# Patient Record
Sex: FEMALE | Race: WHITE | NOT HISPANIC OR LATINO | Employment: FULL TIME | URBAN - METROPOLITAN AREA
[De-identification: names, ages, dates, MRNs, and addresses within clinical notes are randomized per-mention and may not be internally consistent; named-entity substitution may affect disease eponyms.]

---

## 2017-09-27 ENCOUNTER — HISTORICAL (OUTPATIENT)
Dept: LAB | Facility: HOSPITAL | Age: 42
End: 2017-09-27

## 2017-09-27 LAB — PTH-INTACT SERPL-MCNC: 40.7 PG/DL (ref 14–72)

## 2018-12-19 LAB
BILIRUB SERPL-MCNC: NEGATIVE MG/DL
BLOOD URINE, POC: NEGATIVE
CLARITY, POC UA: NORMAL
COLOR, POC UA: NORMAL
GLUCOSE UR QL STRIP: NEGATIVE
KETONES UR QL STRIP: NORMAL
LEUKOCYTE EST, POC UA: NEGATIVE
NITRITE, POC UA: NEGATIVE
PH, POC UA: 5
PROTEIN, POC: NORMAL
SPECIFIC GRAVITY, POC UA: 1.01
UROBILINOGEN, POC UA: NORMAL

## 2019-02-07 LAB
INFLUENZA A ANTIGEN, POC: NEGATIVE
INFLUENZA B ANTIGEN, POC: NEGATIVE
RAPID GROUP A STREP (OHS): NEGATIVE

## 2019-04-24 ENCOUNTER — HISTORICAL (OUTPATIENT)
Dept: ADMINISTRATIVE | Facility: HOSPITAL | Age: 44
End: 2019-04-24

## 2019-09-11 ENCOUNTER — HISTORICAL (OUTPATIENT)
Dept: ADMINISTRATIVE | Facility: HOSPITAL | Age: 44
End: 2019-09-11

## 2020-08-13 LAB
CHOLEST SERPL-MSCNC: 206 MG/DL (ref 0–200)
HBA1C MFR BLD: 5.1 % (ref 4–6)
HDLC SERPL-MCNC: 61 MG/DL (ref 35–70)
LDLC SERPL CALC-MCNC: 133 MG/DL (ref 0–160)
TRIGL SERPL-MCNC: 59 MG/DL (ref 40–160)

## 2020-09-02 ENCOUNTER — HISTORICAL (OUTPATIENT)
Dept: ADMINISTRATIVE | Facility: HOSPITAL | Age: 45
End: 2020-09-02

## 2020-12-14 ENCOUNTER — HISTORICAL (OUTPATIENT)
Dept: LAB | Facility: HOSPITAL | Age: 45
End: 2020-12-14

## 2020-12-14 LAB — SARS-COV-2 RNA RESP QL NAA+PROBE: DETECTED

## 2021-01-31 ENCOUNTER — HISTORICAL (OUTPATIENT)
Dept: ADMINISTRATIVE | Facility: HOSPITAL | Age: 46
End: 2021-01-31

## 2021-01-31 LAB
INFLUENZA A ANTIGEN, POC: NEGATIVE
INFLUENZA B ANTIGEN, POC: NEGATIVE

## 2021-02-17 ENCOUNTER — HISTORICAL (OUTPATIENT)
Dept: URGENT CARE | Facility: CLINIC | Age: 46
End: 2021-02-17

## 2021-02-17 LAB
BILIRUB SERPL-MCNC: NEGATIVE MG/DL
BLOOD URINE, POC: NEGATIVE
CLARITY, POC UA: NORMAL
COLOR, POC UA: YELLOW
GLUCOSE UR QL STRIP: NEGATIVE
KETONES UR QL STRIP: NEGATIVE
LEUKOCYTE EST, POC UA: NORMAL
NITRITE, POC UA: POSITIVE
PH, POC UA: 5
PROTEIN, POC: NORMAL
SPECIFIC GRAVITY, POC UA: 1.01
UROBILINOGEN, POC UA: NORMAL

## 2021-03-02 ENCOUNTER — HISTORICAL (OUTPATIENT)
Dept: ADMINISTRATIVE | Facility: HOSPITAL | Age: 46
End: 2021-03-02

## 2021-03-02 LAB
APPEARANCE, UA: ABNORMAL
BACTERIA #/AREA URNS AUTO: ABNORMAL /HPF
BILIRUB UR QL STRIP: NEGATIVE MG/DL
COLOR UR: YELLOW
GLUCOSE (UA): NEGATIVE MG/DL
HGB UR QL STRIP: ABNORMAL UNIT/L
KETONES UR QL STRIP: NEGATIVE MG/DL
LEUKOCYTE ESTERASE UR QL STRIP: ABNORMAL UNIT/L
NITRITE UR QL STRIP.AUTO: POSITIVE
PH UR STRIP: 5.5 [PH]
PROT UR QL STRIP: ABNORMAL MG/DL
RBC #/AREA URNS HPF: ABNORMAL /HPF
SP GR UR STRIP: 1.01
SQUAMOUS EPITHELIAL, UA: ABNORMAL /LPF
UROBILINOGEN UR STRIP-ACNC: 0.2 MG/DL
WBC #/AREA URNS AUTO: ABNORMAL /[HPF]

## 2021-04-08 ENCOUNTER — HISTORICAL (OUTPATIENT)
Dept: ADMINISTRATIVE | Facility: HOSPITAL | Age: 46
End: 2021-04-08

## 2022-04-07 ENCOUNTER — HISTORICAL (OUTPATIENT)
Dept: ADMINISTRATIVE | Facility: HOSPITAL | Age: 47
End: 2022-04-07

## 2022-04-24 VITALS
OXYGEN SATURATION: 97 % | DIASTOLIC BLOOD PRESSURE: 80 MMHG | WEIGHT: 148.13 LBS | HEIGHT: 66 IN | SYSTOLIC BLOOD PRESSURE: 120 MMHG | BODY MASS INDEX: 23.81 KG/M2

## 2022-05-05 NOTE — HISTORICAL [facility] CERNER
This is a historical note converted from [place]. Formatting and pictures may have been removed.  Please reference [place] for original formatting and attached multimedia. Chief Complaint  BACK PAIN XS 3 WKS  History of Present Illness  Pt was warming up at [employer] 3 weeks ago. She felt a pulling in her lower back at that time and it has not felt right since. She denies any history of low back problems. Heat alleviates the discomfort. Worse with bending and and transferring. ?She has to walk slowly. She has tried rolling and stretching. She has tried Tylenol, Advil, cyclobenzaprine and robaxin; none of which worked. She had two Norco left which helped her sleep. Denies leg pain or weakness.  Review of Systems  See HPI.  Physical Exam  Vitals & Measurements  T:?36.7? ?C (Oral)? HR:?103(Peripheral)? BP:?106/80? SpO2:?98%?  HT:?167.64?cm? WT:?68.100?kg? BMI:?24.23?  General:?She is a well-developed well-nourished white female in no apparent distress.? She transfers slowly and deliberately.? She ambulates slowly.? She appears to be in mild discomfort?with movement.  Low back: Normal in appearance,?no skin lesions,?she has tenderness to?palpation over mid lumbar spine?as well as?over the adjacent paralumbar muscles and bilateral sacroiliac joints. ?She has?limitation of extension and bilateral lateral flexion.? She also has limitation of flexion?due to?discomfort.? Negative straight leg raise bilaterally.  Assessment/Plan  Low back pain?M54.5  X-rays pending.  Light activity/stretches/continue heat.  Norco 7.5 mg: Take 1 tablet every 6 hours as needed; note:?Take?at bedtime for sleep.? Potential side effects including sedation,?drowsiness?dizziness and habituation discussed with patient.? Patient expressed understanding.  Continue Celebrex.  Patient has clonazepam?for?anxiety; I advised her to take 1/2?to 1 tablet in evening for muscle spasm.  She is to call if symptoms persist or worsen.  Ordered:  Clinic Follow-up PRN,  04/08/21 13:51:00 CDT, [facility] - [facility], Future Order  Office/Outpatient Visit Level 3 Established [id number] PC, Low back pain, [facility] - [facility], 04/08/21 13:51:00 CDT  XR Spine Lumbar 2 or 3 Views, Routine, 04/08/21 13:33:00 CDT, None, Ambulatory, Rad Type, Low back pain, Not Scheduled, [facility], 04/08/21 13:33:00 CDT  ?  Orders:  acetaminophen-HYDROcodone, 1 tab(s), Oral, q6hr, PRN PRN as needed for pain, # 15 tab(s), 0 Refill(s), Pharmacy: [facility], 167.64, cm, Height/Length Dosing, 04/08/21 13:18:00 CDT, 68.1, kg, Weight Dosing, 04/08/21 13:18:00 CDT  lisinopril, See Instructions, TAKE1/2 TABLET Q PM, # 30 tab(s), 5 Refill(s), Pharmacy: [facility], 168, cm, Height/Length Dosing, 09/02/20 15:57:00 CDT, 68, kg, Weight Dosing, 09/02/20 15:57:00 CDT  Referrals  Clinic Follow-up PRN, 04/08/21 13:51:00 CDT, [facility] - [facility], Future Order   Problem List/Past Medical History  Ongoing  ADHD (attention deficit hyperactivity disorder), inattentive type  De Quervains tenosynovitis  Depression  Hypertension  Lateral epicondylitis, left elbow  Trochanteric bursitis, right hip  Historical  Cough  Renal calculus  Renal calculus  Procedure/Surgical History  History of total left knee replacement (08/13/2018)  FESS (functional endoscopic sinus surgery) total (08/06/2002)  Nasal septoplasty (08/06/2002)  Tonsillectomy (08/06/2002)  Cystoscopy (09.1997)  Appendectomy   Medications  Adderall 20 mg oral tablet, 20 mg= 1 tab(s), Oral, TID  Adderall 20 mg oral tablet, 20 mg= 1 tab(s), Oral, TID  Adderall 20 mg oral tablet, 20 mg= 1 tab(s), Oral, TID  CeleBREX 100 mg oral capsule, 100 mg= 1 cap(s), Oral, BID, PRN, 2 refills  FLUoxetine 40 mg oral capsule, See Instructions, 5 refills  KlonoPIN 1 mg oral tablet, See Instructions  lisinopril 10 mg oral tablet, See Instructions, 5 refills  Norco 7.5 mg-325 mg oral tablet, 1 tab(s), Oral, q6hr, PRN  ProAir HFA 90 mcg/inh inhalation aerosol with adapter, 2 puff(s),  INH, q6hr, PRN  Allergies  Blistex Lip Balm?(Lip swelling)  Social History  Abuse/Neglect  No, 02/17/2021  Alcohol  Current, 1-2 times per month, 07/26/2018  Employment/School  Employed, Work/School description: AGENCY NURSE., 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 Living situation: Home/Independent., 07/26/2018  Nutrition/Health  Regular, Caffeine intake amount: COFFEE ON OCCASION (3 X/WEEK)., 07/26/2018  Substance Use  Never, 12/19/2018  Tobacco  Never (less than 100 in lifetime), N/A, 02/17/2021  Family History  Breast cancer.: Mother.  Hypertension.: Father.  Immunizations  Vaccine Date Status   influenza virus vaccine, inactivated 01/20/2017 Recorded   influenza virus vaccine, inactivated 10/24/2014 Recorded   Health Maintenance  Health Maintenance  ???Pending?(in the next year)  ??? ??OverDue  ??? ? ? ?Hypertension Management-BMP due??05/20/15??and every 1??year(s)  ??? ? ? ?Diabetes Screening due??05/19/17??and every 3??year(s)  ??? ? ? ?Influenza Vaccine due??10/01/20??and every 1??day(s)  ??? ? ? ?Alcohol Misuse Screening due??01/02/21??and every 1??year(s)  ??? ??Due?  ??? ? ? ?ADL Screening due??04/08/21??and every 1??year(s)  ??? ? ? ?Depression Screening due??04/08/21??Unknown Frequency  ??? ? ? ?Hypertension Management-Education due??04/08/21??and every 1??year(s)  ??? ? ? ?Tetanus Vaccine due??04/08/21??and every 10??year(s)  ??? ??Due In Future?  ??? ? ? ?TB Skin Test not due until??08/31/21??and every 1??year(s)  ??? ? ? ?Obesity Screening not due until??01/01/22??and every 1??year(s)  ???Satisfied?(in the past 1 year)  ??? ??Satisfied?  ??? ? ? ?Blood Pressure Screening on??04/08/21.??Satisfied by Jas Casillas LPN  ??? ? ? ?Body Mass Index Check on??04/08/21.??Satisfied by Jas Casillas LPN  ??? ? ? ?Cervical Cancer Screening on??08/13/20.??Satisfied by Deborah Camargo  ??? ? ? ?Hypertension Management-Blood Pressure  on??04/08/21.??Satisfied by Jas Casillas LPN  ??? ? ? ?Obesity Screening on??04/08/21.??Satisfied by Jas Casillas LPN  ??? ? ? ?TB Skin Test on??08/31/20.  ?      X-rays of lumbar spine are normal.

## 2022-05-05 NOTE — HISTORICAL [facility]ERN[facility]
This is a historical note converted from [place]. Formatting and pictures may have been removed.  Please reference [place] for original formatting and attached multimedia. Chief Complaint  Achy and cough since yesterday  History of Present Illness  Pt?45 Years?White?Female?seen and evaluated in a limited status for concern for COVID-19. In order to decrease the risk of exposure to the hospital and health care workers, only a limited exam was done.  ?   Risk Factors Include  Had COVID about 1.5 months prior presents for about 15 hours of body aches and cough.? No fever.? B lower lung tightness and tenderness just above the diaphragm when taking in deep breaths.  ?   Provider Precautions  Surgical Mask,  Standard Isolation Gown,  Gloves,  Eye Protection- Face Shield  ?  Covid Screening?  No confirmed close contact  No travel to high risk area  No Recent Testing Done  Review of Systems  Constitutional_negative for fever  ENMT_+rhinorrhea  Respiratory_+ cough  Gastrointestinal_negative for nausea or vomiting  Integumentary_negative for rash  Physical Exam  Vitals & Measurements  T:?36.4? ?C (Oral)? HR:?118(Peripheral)? RR:?17? BP:?104/74? SpO2:?100%?  HT:?168.00?cm? WT:?74.000?kg? BMI:?26.22?  Gen: WD, fatigued, lying flat on exam table  CV: S1S2 RRR no MRG  Resp: reduced breath sounds BL  HEENT: clear rhinorrhea, no cervical JOSÉ ANTONIO, +PND, TMs without bulging or erythema bilaterally, no posterior pharyngeal erythema?  Psych: Cooperative, approp mood and affect  Assessment/Plan  1.?Viral illness?B34.9  ?Force fluids.? Medrol with food.? Tylenol.? Recheck as needed.?  No work until improved symptoms.? Start with 3 days excuse.?  Differential includes COVID variant.  Ordered:  Office/Outpatient Visit Level 3 Established [id number] PC, Viral illness  Cough, [facility], 01/31/21 9:55:00 CST  ?  2.?Cough?R05,?Cough?R05  ?X ray of the chest done today, per my review some patchy changes to the lower lobes.?  ?Will call with final  report.  Ordered:  Office/Outpatient Visit Level 3 Established [id number] PC, Viral illness  Cough, [facility], 01/31/21 9:55:00 CST  XR Chest 2 Views, Routine, 01/31/21 9:33:00 CST, None, Ambulatory, Rad Type, Cough, Not Scheduled, 01/31/21 9:33:00 CST  ?   Problem List/Past Medical History  Ongoing  ADHD (attention deficit hyperactivity disorder), inattentive type  De Quervains tenosynovitis  Depression  Hypertension  Lateral epicondylitis, left elbow  Trochanteric bursitis, right hip  Historical  Cough  Renal calculus  Renal calculus  Procedure/Surgical History  History of total left knee replacement (08/13/2018)  FESS (functional endoscopic sinus surgery) total (08/06/2002)  Nasal septoplasty (08/06/2002)  Tonsillectomy (08/06/2002)  Cystoscopy (09.1997)  Appendectomy   Medications  Adderall 20 mg oral tablet, 20 mg= 1 tab(s), Oral, TID  Adderall 20 mg oral tablet, 20 mg= 1 tab(s), Oral, TID  Adderall 20 mg oral tablet, 20 mg= 1 tab(s), Oral, TID  CeleBREX 100 mg oral capsule, 100 mg= 1 cap(s), Oral, BID, PRN, 2 refills  FLUoxetine 40 mg oral capsule, See Instructions, 5 refills  KlonoPIN 1 mg oral tablet, See Instructions, 1 refills  lisinopril 10 mg oral tablet, See Instructions, 5 refills  vitamin E, See Instructions  Allergies  Blistex Lip Balm?(Lip swelling)  Social History  Abuse/Neglect  No, 01/31/2021  Alcohol  Current, 1-2 times per month, 07/26/2018  Employment/School  Employed, Work/School description: AGENCY NURSE., 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 Living situation: Home/Independent., 07/26/2018  Nutrition/Health  Regular, Caffeine intake amount: COFFEE ON OCCASION (3 X/WEEK)., 07/26/2018  Substance Use  Never, 12/19/2018  Tobacco  Never (less than 100 in lifetime), N/A, 01/31/2021  Family History  Breast cancer.: Mother.  Hypertension.: Father.  Immunizations  Vaccine Date Status   influenza virus vaccine, inactivated 01/20/2017  Recorded   influenza virus vaccine, inactivated 10/24/2014 Recorded   Health Maintenance  Health Maintenance  ???Pending?(in the next year)  ??? ??OverDue  ??? ? ? ?Hypertension Management-BMP due??05/20/15??and every 1??year(s)  ??? ? ? ?Diabetes Screening due??05/19/17??and every 3??year(s)  ??? ? ? ?Influenza Vaccine due??10/01/20??and every 1??day(s)  ??? ??Due?  ??? ? ? ?Alcohol Misuse Screening due??01/02/21??and every 1??year(s)  ??? ? ? ?ADL Screening due??01/31/21??and every 1??year(s)  ??? ? ? ?Depression Screening due??01/31/21??Unknown Frequency  ??? ? ? ?Hypertension Management-Education due??01/31/21??and every 1??year(s)  ??? ? ? ?Tetanus Vaccine due??01/31/21??and every 10??year(s)  ??? ??Due In Future?  ??? ? ? ?TB Skin Test not due until??08/31/21??and every 1??year(s)  ??? ? ? ?Obesity Screening not due until??01/01/22??and every 1??year(s)  ???Satisfied?(in the past 1 year)  ??? ??Satisfied?  ??? ? ? ?Blood Pressure Screening on??01/31/21.??Satisfied by Pura GUERRERO, Marquis A.  ??? ? ? ?Body Mass Index Check on??01/31/21.??Satisfied by Pura RT, Marquis A.  ??? ? ? ?Cervical Cancer Screening on??08/13/20.??Satisfied by Deborah Camargo  ??? ? ? ?Hypertension Management-Blood Pressure on??01/31/21.??Satisfied by Puar RT, Marquis A.  ??? ? ? ?Influenza Vaccine on??03/02/20.??Satisfied by Nova Obrien LPN  ??? ? ? ?Obesity Screening on??01/31/21.??Satisfied by Marquis Yen  ??? ? ? ?TB Skin Test on??08/31/20.  ?

## 2022-05-05 NOTE — HISTORICAL [facility]
This is a historical note converted from [place]. Formatting and pictures may have been removed.  Please reference [place] for original formatting and attached multimedia. Chief Complaint  Pain to left wrist onset last Friday while working out  History of Present Illness  43-year-old presents with a history of?left wrist pain since?6 days.? States?felt and heard a pop?while?lifting?deadweight at the gym.? No fall no trauma.? Worse pain at nighttime, feeling?tingling?to 4th and 5th fingers. ?No weakness. ?Not dropping things.? Left range of motion limited due to pain.  Review of Systems  Constitutional : No fever, no fatigue  Neck : Negative except HPI  Respiratory : No shortness of breath, no wheezing  Cardiovascular : No chest pain  Musculoskeletal : Negative except HPI  Integumentary : No rash, no abnormal lesion  Neurological : Negative for tingling numbness and weakness  Physical Exam  Vitals & Measurements  T:?36.9? ?C (Oral)? HR:?111(Peripheral)? RR:?16? BP:?139/93? SpO2:?100%?  HT:?168?cm? WT:?76?kg? BMI:?26.93?  General : Alert and oriented, No apparent distress, Afebrile  Neck -: supple, Non Tender  Respiratory :Lungs are clear to auscultate, Breath sounds are equal  Cardiovascular : Normal rate, normal volume pulse bilateral  Musculoskeletal : Left wrist mild fullness noted?radially. ?No point tenderness.? Range of motion present limited with full flexion due to pain. ?Radial pulse 2+ bilateral.  Integumentary :?Warm, Dry?and no rash  Neurologic : Alert and Oriented X 4, sensations intact, motor intact  Assessment/Plan  1.?Left wrist sprain?S63.502A  ? Reviewed the x-rays,?no concerns of fracture.? Discussed in detail on sprain, condition and course. ?Radiology final results will be monitored and reported  Rest, ice, compression and elevation.? Aleve for pain every 12 hours as needed. ?With any acute change call or return to clinic  Ordered:  Office/Outpatient Visit Level 3 Established [id number] PC, Left wrist  sprain, [facility], 04/24/19 17:06:00 CDT  XR Wrist Left Minimum 3 Views, Routine, 04/24/19 16:46:00 CDT, Pain, None, Ambulatory, Rad Type, Left wrist sprain, Not Scheduled, 04/24/19 16:46:00 CDT  ?   Problem List/Past Medical History  Ongoing  Attention deficit disorder  Cough  Depression  Hypertension  Trochanteric bursitis, right hip  Historical  Renal calculus  Renal calculus  Procedure/Surgical History  History of total left knee replacement (08/13/2018)  FESS (functional endoscopic sinus surgery) total (08/06/2002)  Nasal septoplasty (08/06/2002)  Tonsillectomy (08/06/2002)  Cystoscopy (09.1997)  Appendectomy   Medications  Adderall 20 mg oral tablet, 20 mg= 1 tab(s), Oral, BID  Adderall 20 mg oral tablet, 20 mg= 1 tab(s), Oral, BID  Adderall 20 mg oral tablet, 20 mg= 1 tab(s), Oral, BID  ESCITALOPRAM 10 MG TABLET, 10 mg= 1 tab(s), Oral, Daily  lisinopril 10 mg oral tablet, See Instructions, 5 refills  Allergies  Blistex Lip Balm?(Lip swelling)  Social History  Alcohol  Current, 1-2 times per month, 07/26/2018  Employment/School  Employed, Work/School description: AGENCY NURSE., 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 Living situation: Home/Independent., 07/26/2018  Nutrition/Health  Regular, Caffeine intake amount: COFFEE ON OCCASION (3 X/WEEK)., 07/26/2018  Substance Abuse  Never, 12/19/2018  Tobacco  Never (less than 100 in lifetime), N/A, 04/24/2019  Family History  Breast cancer.: Mother.  Hypertension.: Father.  Immunizations  Vaccine Date Status   influenza virus vaccine, inactivated 01/20/2017 Recorded   influenza virus vaccine, inactivated 10/24/2014 Recorded   Health Maintenance  Health Maintenance  ???Pending?(in the next year)  ??? ??OverDue  ??? ? ? ?Hypertension Management-BMP due??11/15/18??and every 1??year(s)  ??? ??Due?  ??? ? ? ?ADL Screening due??04/24/19??and every 1??year(s)  ??? ? ? ?Alcohol Misuse Screening  due??04/24/19??and every 1??year(s)  ??? ? ? ?Diabetes Screening due??04/24/19??and every?  ??? ? ? ?Hypertension Management-Education due??04/24/19??and every 1??year(s)  ??? ? ? ?Smoking Cessation due??04/24/19??Variable frequency  ??? ? ? ?TB Skin Test due??04/24/19??Variable frequency  ??? ? ? ?Tetanus Vaccine due??04/24/19??and every 10??year(s)  ??? ??Due In Future?  ??? ? ? ?Blood Pressure Screening not due until??04/23/20??and every 1??year(s)  ??? ? ? ?Body Mass Index Check not due until??04/23/20??and every 1??year(s)  ??? ? ? ?Hypertension Management-Blood Pressure not due until??04/23/20??and every 1??year(s)  ???Satisfied?(in the past 1 year)  ??? ??Satisfied?  ??? ? ? ?Blood Pressure Screening on??04/24/19.??Satisfied by Gil Bledsoe LPN  ??? ? ? ?Body Mass Index Check on??04/24/19.??Satisfied by Gil Bledsoe LPN  ??? ? ? ?Cervical Cancer Screening on??12/03/18.??Satisfied by Shirley Cruz  ??? ? ? ?Hypertension Management-Blood Pressure on??04/24/19.??Satisfied by Gil Bledsoe LPN  ??? ? ? ?Obesity Screening on??04/24/19.??Satisfied by Gil Bledsoe LPN  ?  ?

## 2022-05-05 NOTE — HISTORICAL [facility]
This is a historical note converted from Lisa. Formatting and pictures may have been removed.  Please reference Lisa for original formatting and attached multimedia. Chief Complaint  LEFT SIDE RIB PAIN- WHILE ON VACATION, SHE SLIPPED AND FELL WHILE CARRYING A BOX OF FROZEN CHICKEN. ?THE BOX WENT UP IN THE AIR AND FELL ONTO HER LEFT FLANK AREA  History of Present Illness  Patient presents with left rib cage discomfort since Saturday. She?cant take a deep breath. She has pain?if she coughs. ?She slipped while carrying a box of frozen chicken?and the box hit her in the side.??She has had?pain since then. No bruising. She has been taking Ibuprofen, Thermocare and she had 2 Tramadol left. She denies any shortness of breath.  Review of Systems  See HPI.  Physical Exam  Vitals & Measurements  HT:?168?cm? WT:?74.8?kg? BMI:?26.5?  Neuro: She is well-developed well-nourished white female?appears to be in mild distress.? She is sitting hunched over. ?She walks gingerly. ?She transfers slowly.  Chest wall: Normal in appearance, no swelling, no?bruising,?she has significant tenderness?over her left?anterior?lateral rib cage?just below?her bra line.? She guards with exam.  Assessment/Plan  1.?Contusion of rib on left side?S20.212A  ?Patient appears to have multiple left rib contusions.  Norco 7.5 m every 6 hours as needed for severe pain.? She also may take Aleve or ibuprofen.  Rest/light activity as tolerated.  She is to call if symptoms persist or worsen.  X-rays pending.  Ordered:  Office/Outpatient Visit Level 3 Established [id number] PC, Contusion of rib on left side, [facility], 19 14:13:00 CDT  ?  Orders:  acetaminophen-HYDROcodone, 1 tab(s), Oral, q6hr, PRN PRN as needed for pain, # 24 tab(s), 0 Refill(s), [facility]  [facility] Follow-up PRN, 19 14:13:00 CDT, [facility] - [facility], Future Order  X-Ray Ribs Left W PA Chest [id number] PC, 19 13:50:00 CDTERIN AMB - [facility], 19  13:50:00 CDT  XR Ribs Left W PA Chest, Routine, 09/11/19 13:53:00 CDT, injury, None, Ambulatory, Rad Type, Not Scheduled, 09/11/19 13:53:00 CDT  Referrals  Clinic Follow-up PRN, 09/11/19 14:13:00 CDT, [facility] - [facility], Future Order   Problem List/Past Medical History  Ongoing  ADHD (attention deficit hyperactivity disorder), inattentive type  Contusion of rib on left side  Cough  De Quervains tenosynovitis  Depression  Hypertension  Trochanteric bursitis, right hip  Historical  Renal calculus  Renal calculus  Procedure/Surgical History  History of total left knee replacement (08/13/2018)  FESS (functional endoscopic sinus surgery) total (08/06/2002)  Nasal septoplasty (08/06/2002)  Tonsillectomy (08/06/2002)  Cystoscopy (09.1997)  Appendectomy   Medications  Adderall 20 mg oral tablet, 20 mg= 1 tab(s), Oral, BID  Adderall 20 mg oral tablet, 20 mg= 1 tab(s), Oral, BID  Adderall 20 mg oral tablet, 20 mg= 1 tab(s), Oral, BID  FLUoxetine 40 mg oral capsule, See Instructions, 5 refills  lisinopril 10 mg oral tablet, See Instructions, 5 refills  Norco 7.5 mg-325 mg oral tablet, 1 tab(s), Oral, q6hr, PRN  ondansetron 8 mg oral tablet, disintegrating, See Instructions  traMADol 50 mg oral tablet, 50 mg= 1 tab(s), Oral, q8hr  Allergies  Blistex Lip Balm?(Lip swelling)  Social History  Abuse/Neglect  No, 09/11/2019  No, 09/04/2019  No, 06/10/2019  Alcohol  Current, 1-2 times per month, 07/26/2018  Employment/School  Employed, Work/School description: AGENCY NURSE., 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 Living situation: Home/Independent., 07/26/2018  Nutrition/Health  Regular, Caffeine intake amount: COFFEE ON OCCASION (3 X/WEEK)., 07/26/2018  Substance Use  Never, 12/19/2018  Tobacco  Never (less than 100 in lifetime), N/A, 09/11/2019  Never (less than 100 in lifetime), N/A, 09/11/2019  Never (less than 100 in lifetime), N/A, 09/04/2019  Never (less than  100 in lifetime), N/A, 06/10/2019  Never (less than 100 in lifetime), N/A, 04/24/2019  Family History  Breast cancer.: Mother.  Hypertension.: Father.  Immunizations  Vaccine Date Status   influenza virus vaccine, inactivated 01/20/2017 Recorded   influenza virus vaccine, inactivated 10/24/2014 Recorded   Health Maintenance  Health Maintenance  ???Pending?(in the next year)  ??? ??OverDue  ??? ? ? ?Hypertension Management-BMP due??11/15/18??and every 1??year(s)  ??? ? ? ?Alcohol Misuse Screening due??01/01/19??and every 1??year(s)  ??? ??Due?  ??? ? ? ?ADL Screening due??09/11/19??and every 1??year(s)  ??? ? ? ?Diabetes Screening due??09/11/19??and every?  ??? ? ? ?Hypertension Management-Education due??09/11/19??and every 1??year(s)  ??? ? ? ?Influenza Vaccine due??09/11/19??and every?  ??? ? ? ?Tetanus Vaccine due??09/11/19??and every 10??year(s)  ??? ??Due In Future?  ??? ? ? ?Obesity Screening not due until??01/01/20??and every 1??year(s)  ??? ? ? ?TB Skin Test not due until??08/17/20??and every 1??year(s)  ??? ? ? ?Blood Pressure Screening not due until??09/03/20??and every 1??year(s)  ??? ? ? ?Hypertension Management-Blood Pressure not due until??09/03/20??and every 1??year(s)  ??? ? ? ?Body Mass Index Check not due until??09/10/20??and every 1??year(s)  ???Satisfied?(in the past 1 year)  ??? ??Satisfied?  ??? ? ? ?Blood Pressure Screening on??09/04/19.??Satisfied by Nova Obrien LPN  ??? ? ? ?Body Mass Index Check on??09/11/19.??Satisfied by Nova Obrien LPN  ??? ? ? ?Cervical Cancer Screening on??12/03/18.??Satisfied by Shirley Cruz  ??? ? ? ?Hypertension Management-Blood Pressure on??09/04/19.??Satisfied by Nova Obrien LPN  ??? ? ? ?Obesity Screening on??09/11/19.??Satisfied by Nova Obrien LPN  ??? ? ? ?TB Skin Test on??08/17/19.  ?      X-rays are negative for rib fractures.
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
## 2022-09-13 PROBLEM — M25.552 BILATERAL HIP PAIN: Status: ACTIVE | Noted: 2022-09-13

## 2022-09-13 PROBLEM — M25.551 BILATERAL HIP PAIN: Status: ACTIVE | Noted: 2022-09-13

## 2022-09-21 ENCOUNTER — HISTORICAL (OUTPATIENT)
Dept: ADMINISTRATIVE | Facility: HOSPITAL | Age: 47
End: 2022-09-21

## 2022-09-22 ENCOUNTER — HISTORICAL (OUTPATIENT)
Dept: ADMINISTRATIVE | Facility: HOSPITAL | Age: 47
End: 2022-09-22

## 2022-10-22 ENCOUNTER — OFFICE VISIT (OUTPATIENT)
Dept: URGENT CARE | Facility: CLINIC | Age: 47
End: 2022-10-22
Payer: COMMERCIAL

## 2022-10-22 VITALS
TEMPERATURE: 100 F | HEART RATE: 91 BPM | HEIGHT: 65 IN | RESPIRATION RATE: 20 BRPM | SYSTOLIC BLOOD PRESSURE: 132 MMHG | BODY MASS INDEX: 25.83 KG/M2 | DIASTOLIC BLOOD PRESSURE: 92 MMHG | WEIGHT: 155 LBS | OXYGEN SATURATION: 99 %

## 2022-10-22 DIAGNOSIS — R05.9 COUGH, UNSPECIFIED TYPE: ICD-10-CM

## 2022-10-22 DIAGNOSIS — J32.9 SINUSITIS, UNSPECIFIED CHRONICITY, UNSPECIFIED LOCATION: Primary | ICD-10-CM

## 2022-10-22 LAB
CTP QC/QA: YES
CTP QC/QA: YES
FLUAV AG NPH QL: NEGATIVE
FLUBV AG NPH QL: NEGATIVE
SARS-COV-2 RDRP RESP QL NAA+PROBE: NEGATIVE

## 2022-10-22 PROCEDURE — 1159F MED LIST DOCD IN RCRD: CPT | Mod: CPTII,,, | Performed by: FAMILY MEDICINE

## 2022-10-22 PROCEDURE — 3075F PR MOST RECENT SYSTOLIC BLOOD PRESS GE 130-139MM HG: ICD-10-PCS | Mod: CPTII,,, | Performed by: FAMILY MEDICINE

## 2022-10-22 PROCEDURE — 96372 PR INJECTION,THERAP/PROPH/DIAG2ST, IM OR SUBCUT: ICD-10-PCS | Mod: ,,, | Performed by: FAMILY MEDICINE

## 2022-10-22 PROCEDURE — 1159F PR MEDICATION LIST DOCUMENTED IN MEDICAL RECORD: ICD-10-PCS | Mod: CPTII,,, | Performed by: FAMILY MEDICINE

## 2022-10-22 PROCEDURE — 99213 PR OFFICE/OUTPT VISIT, EST, LEVL III, 20-29 MIN: ICD-10-PCS | Mod: 25,,, | Performed by: FAMILY MEDICINE

## 2022-10-22 PROCEDURE — 96372 THER/PROPH/DIAG INJ SC/IM: CPT | Mod: ,,, | Performed by: FAMILY MEDICINE

## 2022-10-22 PROCEDURE — 3080F PR MOST RECENT DIASTOLIC BLOOD PRESSURE >= 90 MM HG: ICD-10-PCS | Mod: CPTII,,, | Performed by: FAMILY MEDICINE

## 2022-10-22 PROCEDURE — 3075F SYST BP GE 130 - 139MM HG: CPT | Mod: CPTII,,, | Performed by: FAMILY MEDICINE

## 2022-10-22 PROCEDURE — 4010F ACE/ARB THERAPY RXD/TAKEN: CPT | Mod: CPTII,,, | Performed by: FAMILY MEDICINE

## 2022-10-22 PROCEDURE — 87635 SARS-COV-2 COVID-19 AMP PRB: CPT | Mod: QW,,, | Performed by: FAMILY MEDICINE

## 2022-10-22 PROCEDURE — 87804 POCT INFLUENZA A/B: ICD-10-PCS | Mod: 59,QW,, | Performed by: FAMILY MEDICINE

## 2022-10-22 PROCEDURE — 99213 OFFICE O/P EST LOW 20 MIN: CPT | Mod: 25,,, | Performed by: FAMILY MEDICINE

## 2022-10-22 PROCEDURE — 87635: ICD-10-PCS | Mod: QW,,, | Performed by: FAMILY MEDICINE

## 2022-10-22 PROCEDURE — 4010F PR ACE/ARB THEARPY RXD/TAKEN: ICD-10-PCS | Mod: CPTII,,, | Performed by: FAMILY MEDICINE

## 2022-10-22 PROCEDURE — 3080F DIAST BP >= 90 MM HG: CPT | Mod: CPTII,,, | Performed by: FAMILY MEDICINE

## 2022-10-22 PROCEDURE — 87804 INFLUENZA ASSAY W/OPTIC: CPT | Mod: QW,,, | Performed by: FAMILY MEDICINE

## 2022-10-22 RX ORDER — BETAMETHASONE SODIUM PHOSPHATE AND BETAMETHASONE ACETATE 3; 3 MG/ML; MG/ML
6 INJECTION, SUSPENSION INTRA-ARTICULAR; INTRALESIONAL; INTRAMUSCULAR; SOFT TISSUE
Status: DISCONTINUED | OUTPATIENT
Start: 2022-10-22 | End: 2022-10-22

## 2022-10-22 RX ORDER — HYDROCODONE POLISTIREX AND CHLORPHENIRAMINE POLISTIREX 10; 8 MG/5ML; MG/5ML
5 SUSPENSION, EXTENDED RELEASE ORAL EVERY 12 HOURS PRN
Qty: 70 ML | Refills: 0 | Status: SHIPPED | OUTPATIENT
Start: 2022-10-22 | End: 2022-10-29

## 2022-10-22 RX ORDER — CEFDINIR 300 MG/1
300 CAPSULE ORAL 2 TIMES DAILY
Qty: 20 CAPSULE | Refills: 0 | Status: SHIPPED | OUTPATIENT
Start: 2022-10-22 | End: 2022-11-01

## 2022-10-22 RX ORDER — BETAMETHASONE SODIUM PHOSPHATE AND BETAMETHASONE ACETATE 3; 3 MG/ML; MG/ML
12 INJECTION, SUSPENSION INTRA-ARTICULAR; INTRALESIONAL; INTRAMUSCULAR; SOFT TISSUE
Status: COMPLETED | OUTPATIENT
Start: 2022-10-22 | End: 2022-10-22

## 2022-10-22 RX ADMIN — BETAMETHASONE SODIUM PHOSPHATE AND BETAMETHASONE ACETATE 12 MG: 3; 3 INJECTION, SUSPENSION INTRA-ARTICULAR; INTRALESIONAL; INTRAMUSCULAR; SOFT TISSUE at 10:10

## 2022-10-22 NOTE — PROGRESS NOTES
"Subjective:       Patient ID: Natasha Darnell is a 47 y.o. female.    Vitals:  height is 5' 5" (1.651 m) and weight is 70.3 kg (155 lb). Her oral temperature is 99.5 °F (37.5 °C). Her blood pressure is 132/92 (abnormal) and her pulse is 91. Her respiration is 20 and oxygen saturation is 99%.     Chief Complaint: Cough (Cough, congestion, x 2 days)  We 7-year-old  female presents to office with complaints as listed above and below.  Cough, congestion, x 2 days      Cough  Associated symptoms include a fever and a sore throat.   Constitution: Positive for fatigue and fever.   HENT:  Positive for sinus pain, sinus pressure and sore throat.    Respiratory:  Positive for cough.      Objective:      Physical Exam   Constitutional: She is oriented to person, place, and time.  Non-toxic appearance. She appears ill. No distress.   Eyes: Pupils are equal, round, and reactive to light.   Cardiovascular: Normal rate, regular rhythm, normal heart sounds and normal pulses.   Pulmonary/Chest: Effort normal. No respiratory distress. She has wheezes. She has rhonchi.   Neurological: no focal deficit. She is alert and oriented to person, place, and time.   Skin: Skin is warm and not diaphoretic.   Psychiatric: Her behavior is normal. Mood normal.   Nursing note and vitals reviewed.      Assessment:       1. Sinusitis, unspecified chronicity, unspecified location    2. Cough, unspecified type          Plan:         Sinusitis, unspecified chronicity, unspecified location  -     cefdinir (OMNICEF) 300 MG capsule; Take 1 capsule (300 mg total) by mouth 2 (two) times daily. for 10 days  Dispense: 20 capsule; Refill: 0  -     Discontinue: betamethasone acetate-betamethasone sodium phosphate injection 6 mg  Treatment recommendations as above.    Return to clinic symptoms fail to improve or worsen.  Cough, unspecified type  -     POCT COVID-19 Rapid Screening  -     POCT Influenza A/B    Other orders  -     betamethasone " acetate-betamethasone sodium phosphate injection 12 mg

## 2022-11-29 PROBLEM — U07.1 COVID-19: Status: ACTIVE | Noted: 2022-11-29

## 2022-12-07 ENCOUNTER — PATIENT MESSAGE (OUTPATIENT)
Dept: ADMINISTRATIVE | Facility: OTHER | Age: 47
End: 2022-12-07
Payer: COMMERCIAL

## 2023-01-01 PROBLEM — F41.1 GENERALIZED ANXIETY DISORDER: Status: ACTIVE | Noted: 2023-01-01

## 2023-01-01 PROBLEM — F90.0 ATTENTION DEFICIT HYPERACTIVITY DISORDER (ADHD), PREDOMINANTLY INATTENTIVE TYPE: Status: ACTIVE | Noted: 2023-01-01

## 2023-01-04 PROBLEM — Z23 IMMUNIZATION DUE: Status: ACTIVE | Noted: 2023-01-04

## 2023-03-20 PROBLEM — R52 BODY ACHES: Status: ACTIVE | Noted: 2023-03-20

## 2023-03-20 PROBLEM — J02.9 SORE THROAT: Status: ACTIVE | Noted: 2023-03-20

## 2023-03-20 PROBLEM — J06.9 VIRAL UPPER RESPIRATORY TRACT INFECTION: Status: ACTIVE | Noted: 2023-03-20

## 2023-04-06 LAB
HPV GENOTYPE 16: POSITIVE
HPV GENOTYPE 18,45: NEGATIVE
HUMAN PAPILLOMAVIRUS (HPV): POSITIVE

## 2023-07-24 PROBLEM — G44.209 ACUTE NON INTRACTABLE TENSION-TYPE HEADACHE: Status: ACTIVE | Noted: 2023-07-24

## 2023-10-04 LAB
PAP RECOMMENDATION EXT: NORMAL
PAP SMEAR: NORMAL

## 2023-10-24 PROBLEM — G47.00 INSOMNIA: Status: ACTIVE | Noted: 2023-10-24

## 2023-12-08 PROBLEM — J40 BRONCHITIS: Status: ACTIVE | Noted: 2023-12-08

## 2024-02-06 PROBLEM — F51.01 PRIMARY INSOMNIA: Status: ACTIVE | Noted: 2023-10-24

## 2024-02-07 ENCOUNTER — OFFICE VISIT (OUTPATIENT)
Dept: PRIMARY CARE CLINIC | Facility: CLINIC | Age: 49
End: 2024-02-07
Payer: COMMERCIAL

## 2024-02-07 VITALS
OXYGEN SATURATION: 97 % | SYSTOLIC BLOOD PRESSURE: 132 MMHG | RESPIRATION RATE: 18 BRPM | HEART RATE: 91 BPM | DIASTOLIC BLOOD PRESSURE: 84 MMHG | HEIGHT: 65 IN | BODY MASS INDEX: 26.39 KG/M2 | WEIGHT: 158.38 LBS

## 2024-02-07 DIAGNOSIS — F33.42 RECURRENT MAJOR DEPRESSIVE DISORDER, IN FULL REMISSION: ICD-10-CM

## 2024-02-07 DIAGNOSIS — F90.0 ATTENTION DEFICIT HYPERACTIVITY DISORDER (ADHD), PREDOMINANTLY INATTENTIVE TYPE: Primary | ICD-10-CM

## 2024-02-07 DIAGNOSIS — F51.01 PRIMARY INSOMNIA: ICD-10-CM

## 2024-02-07 DIAGNOSIS — F41.1 GENERALIZED ANXIETY DISORDER: ICD-10-CM

## 2024-02-07 PROCEDURE — 99213 OFFICE O/P EST LOW 20 MIN: CPT | Mod: ,,, | Performed by: FAMILY MEDICINE

## 2024-02-07 PROCEDURE — 3079F DIAST BP 80-89 MM HG: CPT | Mod: CPTII,,, | Performed by: FAMILY MEDICINE

## 2024-02-07 PROCEDURE — 3008F BODY MASS INDEX DOCD: CPT | Mod: CPTII,,, | Performed by: FAMILY MEDICINE

## 2024-02-07 PROCEDURE — 3075F SYST BP GE 130 - 139MM HG: CPT | Mod: CPTII,,, | Performed by: FAMILY MEDICINE

## 2024-02-07 PROCEDURE — 1160F RVW MEDS BY RX/DR IN RCRD: CPT | Mod: CPTII,,, | Performed by: FAMILY MEDICINE

## 2024-02-07 PROCEDURE — 1159F MED LIST DOCD IN RCRD: CPT | Mod: CPTII,,, | Performed by: FAMILY MEDICINE

## 2024-02-07 RX ORDER — FLUOXETINE HYDROCHLORIDE 40 MG/1
CAPSULE ORAL
Qty: 30 CAPSULE | Refills: 5 | Status: SHIPPED | OUTPATIENT
Start: 2024-02-07

## 2024-02-07 RX ORDER — DEXTROAMPHETAMINE SACCHARATE, AMPHETAMINE ASPARTATE, DEXTROAMPHETAMINE SULFATE AND AMPHETAMINE SULFATE 5; 5; 5; 5 MG/1; MG/1; MG/1; MG/1
TABLET ORAL
Qty: 60 TABLET | Refills: 0 | Status: SHIPPED | OUTPATIENT
Start: 2024-02-07

## 2024-02-07 RX ORDER — BUPROPION HYDROCHLORIDE 300 MG/1
TABLET ORAL
Qty: 30 TABLET | Refills: 5 | Status: SHIPPED | OUTPATIENT
Start: 2024-02-07

## 2024-02-07 RX ORDER — DEXTROAMPHETAMINE SACCHARATE, AMPHETAMINE ASPARTATE, DEXTROAMPHETAMINE SULFATE AND AMPHETAMINE SULFATE 5; 5; 5; 5 MG/1; MG/1; MG/1; MG/1
1 TABLET ORAL 2 TIMES DAILY
Qty: 60 TABLET | Refills: 0 | Status: SHIPPED | OUTPATIENT
Start: 2024-02-07 | End: 2024-03-08

## 2024-02-07 NOTE — PROGRESS NOTES
.Follow-up (3 month f/u/Trazodone gave nightmares )         HPI:    Patient presents for recheck/refill medications.  Patient states medications are working well; she is able to concentrate, focus and stay on task.  She denies palpitations, unintentional weight loss, headaches or dry mouth.     reviewed.    Narcotics agreement updated 02/07/2024.    Current Outpatient Medications:     clonazePAM (KLONOPIN) 1 MG tablet, TAKE 1/2 TO ONE TABLET ONCE DAILY. AS NEEDED FOR ANXIETY, Disp: 30 tablet, Rfl: 5    dextroamphetamine-amphetamine (ADDERALL) 20 mg tablet, Take 1 tablet by mouth 2 times a day., Disp: 60 tablet, Rfl: 0    medroxyPROGESTERone (DEPO-PROVERA) 150 mg/mL injection, INJECT 1 ML INTRAMUSCULARLY EVERY THREE MONTHS AS NEEDED, Disp: , Rfl:     azithromycin (Z-MARIA DEL CARMEN) 250 MG tablet, Take 250 mg by mouth once daily., Disp: , Rfl:     buPROPion (WELLBUTRIN XL) 300 MG 24 hr tablet, TAKE ONE TABLET ONCE DAILY, Disp: 30 tablet, Rfl: 5    dextroamphetamine-amphetamine (ADDERALL) 20 mg tablet, Take 1 tablet by mouth twice a day., Disp: 60 tablet, Rfl: 0    dextroamphetamine-amphetamine (ADDERALL) 20 mg tablet, Take 1 tablet by mouth 2 (two) times a day. Fill: 3/06/2024., Disp: 60 tablet, Rfl: 0    dextroamphetamine-amphetamine (ADDERALL) 20 mg tablet, Take 1 tablet by mouth 2 times a day. Fill: 4/05/2024., Disp: 60 tablet, Rfl: 0    diclofenac (VOLTAREN) 75 MG EC tablet, 75 mg 2 (two) times daily., Disp: , Rfl:     FLUoxetine 40 MG capsule, Take 1 tablet by mouth daily., Disp: 30 capsule, Rfl: 5    MUCINEX 1,200 mg Ta12, TAKE ONE TABLET TWICE DAILY FOR 7 DAYS, Disp: , Rfl:       ROS:    She tried Trazadone but it caused bad nightmares. She is not sleeping. Lunesta: taste. Ambien: nightmares.      PE:    ..  Vitals:    02/07/24 1404   BP: 132/84   Pulse: 91   Resp: 18        General:  She is well-developed well-nourished white female in no apparent distress.  She is alert and oriented.    Chest: Clear to auscultation  bilaterally.    CV:  Regular rate rhythm without murmurs rubs gallops.        1. Attention deficit hyperactivity disorder (ADHD), predominantly inattentive type  Overview:  Patient is doing well on medications; refills x3 given.    07/24/2023: Patient is doing well on medications; refills x3 given.    10/24/2023: Patient received a notice from pharmacy that they can only fill 60 per month.  Refills x3 given.    02/07/2024:  Patient is doing well on medications; refills x3 given.      2. Generalized anxiety disorder  Overview:  Her anxiety has been stable; she takes medication as needed.   She takes Klonopin infrequently.     Refills sent.    07/24/2023: Patient has been taking 1/2 tablet of clonazepam most days.  Refills sent.    10/24/2023:  Patient has not been able to fill her clonazepam for 3 weeks secondary to shortage.  Her anxiety has been stable.    02/07/2024:  Patient states her anxiety has been doing well; has been decreased.    She takes clonazepam approximately twice a week      3. Recurrent major depressive disorder, in full remission  Overview:  Stable; continue Wellbutrin and Lexapro.  She changed her dosing to evening and is now sleeping better.    10/24/2023:  Her depression has been stable; she denies any sadness, depression or crying spells.    02/07/2024:  Patient states depression has been doing well; she denies any sadness, depression, crying spells or suicidal thoughts.  ER precautions given.    Continue Wellbutrin and fluoxetine; refills sent.    Orders:  -     buPROPion (WELLBUTRIN XL) 300 MG 24 hr tablet; TAKE ONE TABLET ONCE DAILY  Dispense: 30 tablet; Refill: 5  -     FLUoxetine 40 MG capsule; Take 1 tablet by mouth daily.  Dispense: 30 capsule; Refill: 5    4. Primary insomnia  Overview:  Patient has not been sleeping well.    Trial of trazodone 50 mg: Take 1 tab in the evening.  Patient advised to call with update.    02/07/2024:  Patient intolerant of trazodone secondary to  nightmares.    Lunesta causes a metallic taste.    She had nightmares with Ambien.  She declines any sleep medication at this time.      Other orders  -     dextroamphetamine-amphetamine (ADDERALL) 20 mg tablet; Take 1 tablet by mouth twice a day.  Dispense: 60 tablet; Refill: 0  -     dextroamphetamine-amphetamine (ADDERALL) 20 mg tablet; Take 1 tablet by mouth 2 (two) times a day. Fill: 3/06/2024.  Dispense: 60 tablet; Refill: 0  -     dextroamphetamine-amphetamine (ADDERALL) 20 mg tablet; Take 1 tablet by mouth 2 times a day. Fill: 4/05/2024.  Dispense: 60 tablet; Refill: 0              ..Follow up in about 3 months (around 5/7/2024) for Wellness exam fasting.

## 2024-02-11 DIAGNOSIS — F41.1 GENERALIZED ANXIETY DISORDER: ICD-10-CM

## 2024-02-12 RX ORDER — CLONAZEPAM 1 MG/1
TABLET ORAL
Qty: 30 TABLET | Refills: 2 | Status: SHIPPED | OUTPATIENT
Start: 2024-02-12

## 2024-03-05 ENCOUNTER — PATIENT MESSAGE (OUTPATIENT)
Dept: PRIMARY CARE CLINIC | Facility: CLINIC | Age: 49
End: 2024-03-05
Payer: COMMERCIAL

## 2024-03-05 ENCOUNTER — PATIENT OUTREACH (OUTPATIENT)
Dept: ADMINISTRATIVE | Facility: HOSPITAL | Age: 49
End: 2024-03-05
Payer: COMMERCIAL

## 2024-03-05 NOTE — LETTER
AUTHORIZATION FOR RELEASE OF   CONFIDENTIAL INFORMATION    Dear [facility],    We are seeing Natasha Darnell, date of birth 1975, in the clinic at [facility]. Alessandro Oconnor MD is the patient's PCP. Natasha Darnell has an outstanding lab/procedure at the time we reviewed her chart. In order to help keep her health information updated, she has authorized us to request the following medical record(s):        (  )  MAMMOGRAM                                      (  )  COLONOSCOPY      ( * )  PAP SMEAR                                          (  )  OUTSIDE LAB RESULTS     (  )  DEXA SCAN                                          (  )  EYE EXAM            (  )  FOOT EXAM                                          (  )  ENTIRE RECORD     (  )  OUTSIDE IMMUNIZATIONS                 (  )  _______________         Please fax records to [facility], Manuel, Chad B., MD, [phone].     If you have any questions, please contact Alexandru Harris, [facility] at [phone]..           Patient Name: Natasha Darnell  : 1975  Patient Phone #: [phone]

## 2024-03-05 NOTE — PROGRESS NOTES
Population Health. Out Reach. Reviewing patient's chart for quality metrics.Records request sent to [facility] office for pap. I attempt pt outreach re: colorectal cancer screening and diabetic screening lab, mammogram, no answer.         The following record(s)  below were uploaded for Health Maintenance .     8/13/2020 HEMOGLOBIN A1c  8/13/2020 LIPID PANEL

## 2024-03-08 NOTE — PROGRESS NOTES
The following record(s)  below were uploaded for Health Maintenance .          PAP SMEAR  2023  HPV SCREENING   2023

## 2024-03-26 ENCOUNTER — PATIENT MESSAGE (OUTPATIENT)
Dept: PRIMARY CARE CLINIC | Facility: CLINIC | Age: 49
End: 2024-03-26
Payer: COMMERCIAL

## 2024-04-08 ENCOUNTER — TELEPHONE (OUTPATIENT)
Dept: PRIMARY CARE CLINIC | Facility: CLINIC | Age: 49
End: 2024-04-08
Payer: COMMERCIAL

## 2024-05-04 PROBLEM — Z00.00 ENCOUNTER FOR WELLNESS EXAMINATION IN ADULT: Status: ACTIVE | Noted: 2024-05-04

## 2024-05-06 NOTE — PROGRESS NOTES
..Annual Exam       HISTORY OF PRESENT ILLNESS    This 48 y.o. female patient presents to the clinic today for a wellness CPX.  She has been feeling well.    She has been sleeping  Her appetite has been   She is ; no children.    She is a nurse.  She does not smoke.    She is alcohol  Gyn:   Colon?        The patient's Health Maintenance was reviewed and the following appears to be due at this time:   Health Maintenance Due   Topic Date Due    Hepatitis C Screening  Never done    HIV Screening  Never done    TETANUS VACCINE  Never done    Colorectal Cancer Screening  Never done    Mammogram  11/11/2021    Hemoglobin A1c (Diabetic Prevention Screening)  08/13/2023    COVID-19 Vaccine (3 - 2023-24 season) 09/01/2023       ..  Past Medical History:   Diagnosis Date    De Quervain's tenosynovitis     HTN (hypertension)     Lateral epicondylitis of left elbow     Trochanteric bursitis of right hip           ..  Past Surgical History:   Procedure Laterality Date    APPENDECTOMY      cryo to cervix  04/24/2023    CYSTOSCOPY  09/1997    FESS (functional endoscopic sinus surgery) total (  08/06/2002    History of total left knee replacement Left 08/13/2018    JOINT REPLACEMENT  8/13/18    Right knee    NASAL SEPTOPLASTY  08/06/2002    TONSILLECTOMY  08/06/2002          Current Outpatient Medications   Medication Instructions    azithromycin (Z-MARIA DEL CARMEN) 250 mg, Daily    buPROPion (WELLBUTRIN XL) 300 MG 24 hr tablet TAKE ONE TABLET ONCE DAILY    clonazePAM (KLONOPIN) 1 MG tablet Take 1/2-1 tablet daily as needed for anxiety.    dextroamphetamine-amphetamine (ADDERALL) 20 mg tablet Take 1 tablet by mouth 2 times a day.    dextroamphetamine-amphetamine (ADDERALL) 20 mg tablet Take 1 tablet by mouth twice a day.    dextroamphetamine-amphetamine (ADDERALL) 20 mg tablet Take 1 tablet by mouth 2 times a day. Fill: 4/05/2024.    diclofenac (VOLTAREN) 75 mg, 2 times daily    FLUoxetine 40 MG capsule Take 1 tablet by mouth daily.     medroxyPROGESTERone (DEPO-PROVERA) 150 mg/mL injection INJECT 1 ML INTRAMUSCULARLY EVERY THREE MONTHS AS NEEDED    MUCINEX 1,200 mg Ta12 TAKE ONE TABLET TWICE DAILY FOR 7 DAYS         ..  Social History     Socioeconomic History    Marital status:     Number of children: 0   Occupational History    Occupation: Nurse   Tobacco Use    Smoking status: Never    Smokeless tobacco: Never   Substance and Sexual Activity    Alcohol use: Yes     Comment: once every 2 weeks    Drug use: Never    Sexual activity: Yes     Partners: Male     Birth control/protection: None, Injection     Social Determinants of Health     Financial Resource Strain: Low Risk  (5/6/2024)    Overall Financial Resource Strain (CARDIA)     Difficulty of Paying Living Expenses: Not hard at all   Food Insecurity: No Food Insecurity (5/6/2024)    Hunger Vital Sign     Worried About Running Out of Food in the Last Year: Never true     Ran Out of Food in the Last Year: Never true   Transportation Needs: No Transportation Needs (5/6/2024)    PRAPARE - Transportation     Lack of Transportation (Medical): No     Lack of Transportation (Non-Medical): No   Physical Activity: Insufficiently Active (5/6/2024)    Exercise Vital Sign     Days of Exercise per Week: 4 days     Minutes of Exercise per Session: 30 min   Stress: Stress Concern Present (5/6/2024)    [place] [employer] - Occupational Stress Questionnaire     Feeling of Stress : Very much   Housing Stability: Unknown (5/6/2024)    Housing Stability Vital Sign     Unable to Pay for Housing in the Last Year: No          ..  Family History   Problem Relation Name Age of Onset    Breast cancer Mother      Hypertension Father Farhad     Hypertension Sister Nova     Cushing syndrome Sister            ..  Review of patient's allergies indicates:   Allergen Reactions    Dimethicone-znox-vit a-d-aloe Swelling     Other reaction(s): Lip swelling    Hydrocodone Itching     "      ..  Immunization History   Administered Date(s) Administered    COVID-19, MRNA, LN-S, PF (Pfizer) (Purple Cap) 08/04/2021, 08/25/2021    Influenza - Quadrivalent 01/04/2023, 10/24/2023    Influenza - Quadrivalent - PF *Preferred* (6 months and older) 10/24/2014, 10/28/2020, 12/15/2021, 01/04/2023    Influenza - Trivalent - PF (ADULT) 10/24/2014, 01/20/2017, 10/28/2020, 12/15/2021          REVIEW OF SYSTEMS:    GENERAL:   {no/+:16214} unexplained wt gain/loss,  fever, fatigue, chills, night sweats or weakness, + insomnia  HEENT: no sore throat, ear pain, sinus pressure, nasal congestion, or rhinorrhea  VISION: no vision changes, glaucoma, cataracts  CARDIAC: no chest pain, palpitations, dyspnea on exertion, orthopnea  RESPIRATORY: no cough, wheezing, sputum production, or SOB  GI: no abdominal pain, n&v, constipation, diarrhea,  blood in stool or  {+ OR -:51997::"(+)"}family history of colon cancer  {renee relatives:20122::"                                         "}  : no dysuria, hematuria, frequency,  urgency, incontinence,  vaginal discharge,  abnormal vaginal bleeding  MUSC/SKEL:  no myalgia, weakness, edema,  arthralgia, or joint effusion  SKIN:  No rash, hives, itching or sores  NEURO:  + headaches, numbness,  tingling, weakness, or dizziness  PSYCH:  + anxiety, + depression,  irritability,  suicidal ideation or hallucinations, + ADD  ENDO:  No polyuria, polydipsia,  polyphagia  HEME:  No bruising,  lymphadenopathy, bleeding disorders, no anemia       PHYSICAL EXAM:    Visit Vitals  /82   Pulse 92   Temp 98.3 °F (36.8 °C)   Ht 5' 5" (1.651 m)   Wt 74.7 kg (164 lb 11.2 oz)   SpO2 97%   BMI 27.41 kg/m²       GENERAL:  Well-developed well-nourished white female in NAD, alert and oriented x 3  SKIN:  no rash or abnormal appearing skin lesions  HEENT:  PERRLA, EOMI, mouth wnl, throat wnl, EAC and TM wnl bilaterally  NECK:  FROM, no lymphadenopathy, no thyroid abnormalities palpable  CHEST:  CTA " bilaterally no wheezes, crackles or rubs  CARDIAC:  RRR, no murmurs audible  ABDOMEN:  Soft, nontender, nondistended, NBSx4, no rebound or guarding, no HSM  EXTREMITIES:  no clubbing, cyanosis, or edema.  joints wnl. +2 DP/PT pulse bilaterally  NEURO:  no sensory or motor deficits noted. CN II-XII intact. Gait wnl.           ASSESSMENT AND PLAN     1. Encounter for wellness examination in adult    2. Attention deficit hyperactivity disorder (ADHD), predominantly inattentive type  Overview:  Patient is doing well on medications; refills x3 given.    07/24/2023: Patient is doing well on medications; refills x3 given.    10/24/2023: Patient received a notice from pharmacy that they can only fill 60 per month.  Refills x3 given.    02/07/2024:  Patient is doing well on medications; refills x3 given.      3. Generalized anxiety disorder  Overview:  Her anxiety has been stable; she takes medication as needed.   She takes Klonopin infrequently.     Refills sent.    07/24/2023: Patient has been taking 1/2 tablet of clonazepam most days.  Refills sent.    10/24/2023:  Patient has not been able to fill her clonazepam for 3 weeks secondary to shortage.  Her anxiety has been stable.    02/07/2024:  Patient states her anxiety has been doing well; has been decreased.    She takes clonazepam approximately twice a week      4. Recurrent major depressive disorder, in full remission  Overview:  Stable; continue Wellbutrin and Lexapro.  She changed her dosing to evening and is now sleeping better.    10/24/2023:  Her depression has been stable; she denies any sadness, depression or crying spells.    02/07/2024:  Patient states depression has been doing well; she denies any sadness, depression, crying spells or suicidal thoughts.  ER precautions given.    Continue Wellbutrin and fluoxetine; refills sent.      5. Primary insomnia  Overview:  Patient has not been sleeping well.    Trial of trazodone 50 mg: Take 1 tab in the  evening.  Patient advised to call with update.    02/07/2024:  Patient intolerant of trazodone secondary to nightmares.    Lunesta causes a metallic taste.    She had nightmares with Ambien.  She declines any sleep medication at this time.             ..No follow-ups on file.     No future appointments.

## 2024-05-07 ENCOUNTER — OFFICE VISIT (OUTPATIENT)
Dept: PRIMARY CARE CLINIC | Facility: CLINIC | Age: 49
End: 2024-05-07
Payer: COMMERCIAL

## 2024-05-07 VITALS
SYSTOLIC BLOOD PRESSURE: 136 MMHG | HEIGHT: 65 IN | DIASTOLIC BLOOD PRESSURE: 82 MMHG | TEMPERATURE: 98 F | WEIGHT: 164.69 LBS | HEART RATE: 92 BPM | BODY MASS INDEX: 27.44 KG/M2 | OXYGEN SATURATION: 97 %

## 2024-05-07 DIAGNOSIS — F33.42 RECURRENT MAJOR DEPRESSIVE DISORDER, IN FULL REMISSION: ICD-10-CM

## 2024-05-07 DIAGNOSIS — F90.0 ATTENTION DEFICIT HYPERACTIVITY DISORDER (ADHD), PREDOMINANTLY INATTENTIVE TYPE: Primary | ICD-10-CM

## 2024-05-07 DIAGNOSIS — F41.1 GENERALIZED ANXIETY DISORDER: ICD-10-CM

## 2024-05-07 DIAGNOSIS — F51.01 PRIMARY INSOMNIA: ICD-10-CM

## 2024-05-07 PROCEDURE — 3079F DIAST BP 80-89 MM HG: CPT | Mod: CPTII,,, | Performed by: FAMILY MEDICINE

## 2024-05-07 PROCEDURE — 3008F BODY MASS INDEX DOCD: CPT | Mod: CPTII,,, | Performed by: FAMILY MEDICINE

## 2024-05-07 PROCEDURE — 99214 OFFICE O/P EST MOD 30 MIN: CPT | Mod: ,,, | Performed by: FAMILY MEDICINE

## 2024-05-07 PROCEDURE — 3075F SYST BP GE 130 - 139MM HG: CPT | Mod: CPTII,,, | Performed by: FAMILY MEDICINE

## 2024-05-07 PROCEDURE — 1159F MED LIST DOCD IN RCRD: CPT | Mod: CPTII,,, | Performed by: FAMILY MEDICINE

## 2024-05-07 RX ORDER — HYDROXYZINE HYDROCHLORIDE 25 MG/1
TABLET, FILM COATED ORAL
Qty: 30 TABLET | Refills: 1 | Status: SHIPPED | OUTPATIENT
Start: 2024-05-07 | End: 2024-05-22

## 2024-05-07 RX ORDER — BUSPIRONE HYDROCHLORIDE 10 MG/1
10 TABLET ORAL 2 TIMES DAILY
Qty: 60 TABLET | Refills: 1 | Status: SHIPPED | OUTPATIENT
Start: 2024-05-07 | End: 2024-06-05

## 2024-05-07 NOTE — PROGRESS NOTES
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 She will be under probation for 2 years starting 6/20/2024.   She went to see Ifeanyi Cedeño concerning regular medications.   He took her off Adderall. He decreased her Klonopin to 0.5 mg; she tested negative after 3 days of not taking. He advised her it should remain in her system for 6 days.   She is not sleeping.   She and her  are fighting.  She is seeing a therapist.     She was started on Remeron but gained 11 # so she stopped taking it.       Current Outpatient Medications   Medication Instructions    buPROPion (WELLBUTRIN XL) 300 MG 24 hr tablet TAKE ONE TABLET ONCE DAILY    busPIRone (BUSPAR) 10 mg, Oral, 2 times daily    clonazePAM (KLONOPIN) 1 MG tablet Take 1/2-1 tablet daily as needed for anxiety.    dextroamphetamine-amphetamine (ADDERALL) 20 mg tablet Take 1 tablet by mouth 2 times a day.    dextroamphetamine-amphetamine (ADDERALL) 20 mg tablet Take 1 tablet by mouth twice a day.    dextroamphetamine-amphetamine (ADDERALL) 20 mg tablet Take 1 tablet by mouth 2 times a day. Fill: 4/05/2024.    FLUoxetine 40 MG capsule Take 1 tablet by mouth daily.    hydrOXYzine HCL (ATARAX) 25 MG tablet Take 1 tablet every 6 hours as needed for anxiety.    medroxyPROGESTERone (DEPO-PROVERA) 150 mg/mL injection INJECT 1 ML INTRAMUSCULARLY EVERY THREE MONTHS AS NEEDED         ROS:    See HPI.      PE:    ..Visit Vitals  /82   Pulse 92   Temp 98.3 °F (36.8 °C)   Ht 5' 5" (1.651 m)   Wt 74.7 kg (164 lb 11.2 oz)   SpO2 97%   BMI 27.41 kg/m²      General:  She is well-developed well-nourished white female in no apparent distress.  She is alert and oriented.  She is tearful at times.  She has a depressed affect.          1. Attention deficit hyperactivity disorder (ADHD), predominantly inattentive type  Overview:  Patient is doing well on medications; refills x3 given.    07/24/2023: Patient is doing well on " medications; refills x3 given.    10/24/2023: Patient received a notice from pharmacy that they can only fill 60 per month.  Refills x3 given.    02/07/2024:  Patient is doing well on medications; refills x3 given.    05/07/2024:  Patient is currently off medications.  She is seeing Ifeanyi Cedeño NP.      2. Generalized anxiety disorder  Overview:  Her anxiety has been stable; she takes medication as needed.   She takes Klonopin infrequently.     Refills sent.    07/24/2023: Patient has been taking 1/2 tablet of clonazepam most days.  Refills sent.    10/24/2023:  Patient has not been able to fill her clonazepam for 3 weeks secondary to shortage.  Her anxiety has been stable.    02/07/2024:  Patient states her anxiety has been doing well; has been decreased.    She takes clonazepam approximately twice a week.    05/07/2024: Patient is currently off of Klonopin.   Patient has a lot of anxiety secondary to current situation.    Start buspirone 10 mg twice daily.    Start hydroxyzine 25 mg: May take 1 tablet every 6 hours as needed for anxiety.  Take 1 prior to bedtime for sleep.  Potential side effects discussed with patient.      3. Recurrent major depressive disorder, in full remission  Overview:  Stable; continue Wellbutrin and Lexapro.  She changed her dosing to evening and is now sleeping better.    10/24/2023:  Her depression has been stable; she denies any sadness, depression or crying spells.    02/07/2024:  Patient states depression has been doing well; she denies any sadness, depression, crying spells or suicidal thoughts.  ER precautions given.    Continue Wellbutrin and fluoxetine; refills sent.    05/07/2024: Patient reports current depression and sadness secondary to current job situation.      4. Primary insomnia  Overview:  Patient has not been sleeping well.    Trial of trazodone 50 mg: Take 1 tab in the evening.  Patient advised to call with update.    02/07/2024:  Patient intolerant of trazodone  secondary to nightmares.    Lunesta causes a metallic taste.    She had nightmares with Ambien.  She declines any sleep medication at this time.    05/07/2024:  Patient has been sleeping 2 hours a night due to anxiety.    Start buspirone 10 mg twice daily.    Start hydroxyzine 25 mg: Take 1 tablet at bedtime for sleep.      Other orders  -     busPIRone (BUSPAR) 10 MG tablet; Take 1 tablet (10 mg total) by mouth 2 (two) times daily.  Dispense: 60 tablet; Refill: 1  -     hydrOXYzine HCL (ATARAX) 25 MG tablet; Take 1 tablet every 6 hours as needed for anxiety.  Dispense: 30 tablet; Refill: 1              ..Follow up in about 1 month (around 6/7/2024).       Future Appointments   Date Time Provider Department Center   6/11/2024 10:00 AM Alessandro Oconnor MD [facility]

## 2024-05-22 DIAGNOSIS — I10 PRIMARY HYPERTENSION: Primary | ICD-10-CM

## 2024-05-22 RX ORDER — HYDROXYZINE HYDROCHLORIDE 25 MG/1
TABLET, FILM COATED ORAL
Qty: 30 TABLET | Refills: 1 | Status: SHIPPED | OUTPATIENT
Start: 2024-05-22 | End: 2024-06-17

## 2024-06-05 DIAGNOSIS — F33.42 RECURRENT MAJOR DEPRESSIVE DISORDER, IN FULL REMISSION: Primary | ICD-10-CM

## 2024-06-05 RX ORDER — BUSPIRONE HYDROCHLORIDE 10 MG/1
10 TABLET ORAL 2 TIMES DAILY
Qty: 60 TABLET | Refills: 1 | Status: SHIPPED | OUTPATIENT
Start: 2024-06-05

## 2024-06-17 DIAGNOSIS — I10 PRIMARY HYPERTENSION: ICD-10-CM

## 2024-06-17 RX ORDER — HYDROXYZINE HYDROCHLORIDE 25 MG/1
TABLET, FILM COATED ORAL
Qty: 30 TABLET | Refills: 1 | Status: SHIPPED | OUTPATIENT
Start: 2024-06-17

## 2024-06-23 NOTE — PROGRESS NOTES
"Follow-up and Tinnitus (X 1 month)       HPI:    Patient presents for follow-up.  I saw her on 05/07/2024.  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  She had started seen Ifeanyi Cedeño regarding her medications.  He discontinued her Adderall.  He stopped her Klonopin.  She subsequently had difficulties with sleeping.  She and her  were not getting along.  She has been seeing a therapist.  We started her on buspirone 10 mg twice daily and hydroxyzine 25 mg as needed.    She is feeling better. She takes 1/2 tablet of hydroxyzine every other night for rest. She has been resting well. She is less aggravated and irritable.  She feels well on Buspirone.  She denies sadness, depression or crying spells.   She will resume her ADD medication once she has a job.  She has 3 interviews tomorrow.           Current Outpatient Medications   Medication Instructions    busPIRone (BUSPAR) 10 mg, Oral, 2 times daily    dextroamphetamine-amphetamine (ADDERALL) 20 mg tablet Take 1 tablet by mouth 2 times a day.    dextroamphetamine-amphetamine (ADDERALL) 20 mg tablet Take 1 tablet by mouth twice a day.    dextroamphetamine-amphetamine (ADDERALL) 20 mg tablet Take 1 tablet by mouth 2 times a day. Fill: 4/05/2024.    FLUoxetine 40 MG capsule Take 1 tablet by mouth daily.    hydrOXYzine HCL (ATARAX) 25 MG tablet Take 1 tablet every 6 hours as needed for anxiety.    medroxyPROGESTERone (DEPO-PROVERA) 150 mg/mL injection INJECT 1 ML INTRAMUSCULARLY EVERY THREE MONTHS AS NEEDED         ROS:    She has noticed tinnitus for 1 month. She has had it before. It is continuous. She may have some hearing loss.      PE:    ..Visit Vitals  /82   Pulse 105   Temp 98.4 °F (36.9 °C)   Ht 5' 5" (1.651 m)   Wt 78.5 kg (173 lb)   SpO2 100%   BMI 28.79 kg/m²        General: She is well-developed well-nourished white female in no apparent distress.  She is alert and oriented.  Ears:  Cerumen impaction " bilaterally.  Chest: Clear to auscultation bilaterally.    CV: Regular rate rhythm without murmurs rubs or gallops.        1. Generalized anxiety disorder  Overview:  Her anxiety has been stable; she takes medication as needed.   She takes Klonopin infrequently.     Refills sent.    07/24/2023: Patient has been taking 1/2 tablet of clonazepam most days.  Refills sent.    10/24/2023:  Patient has not been able to fill her clonazepam for 3 weeks secondary to shortage.  Her anxiety has been stable.    02/07/2024:  Patient states her anxiety has been doing well; has been decreased.    She takes clonazepam approximately twice a week.    05/07/2024: Patient is currently off of Klonopin.   Patient has a lot of anxiety secondary to current situation.    Start buspirone 10 mg twice daily.    Start hydroxyzine 25 mg: May take 1 tablet every 6 hours as needed for anxiety.  Take 1 prior to bedtime for sleep.  Potential side effects discussed with patient.    06/25/2024: Her anxiety is doing well on buspirone; refills sent.    She takes 1/2 tablet of hydroxyzine every other evening and sleeps very well.  She denies any panic attacks.      2. Recurrent major depressive disorder, in full remission  Overview:  Stable; continue Wellbutrin and Lexapro.  She changed her dosing to evening and is now sleeping better.    10/24/2023:  Her depression has been stable; she denies any sadness, depression or crying spells.    02/07/2024:  Patient states depression has been doing well; she denies any sadness, depression, crying spells or suicidal thoughts.  ER precautions given.    Continue Wellbutrin and fluoxetine; refills sent.    05/07/2024: Patient reports current depression and sadness secondary to current job situation.    06/25/2024:  Patient states her depression is doing much better; continue fluoxetine.    She denies any sadness, depression, crying spells or suicidal thoughts; ER precautions given.    Orders:  -     busPIRone  (BUSPAR) 10 MG tablet; Take 1 tablet (10 mg total) by mouth 2 (two) times daily.  Dispense: 60 tablet; Refill: 5    3. Attention deficit hyperactivity disorder (ADHD), predominantly inattentive type  Overview:  Patient is doing well on medications; refills x3 given.    07/24/2023: Patient is doing well on medications; refills x3 given.    10/24/2023: Patient received a notice from pharmacy that they can only fill 60 per month.  Refills x3 given.    02/07/2024:  Patient is doing well on medications; refills x3 given.    05/07/2024:  Patient is currently off medications.  She is seeing Ifeanyi Cedeño NP.    06/25/2024:  Patient is still off medications; we will be able to resume medications once she is working again.      4. Bilateral hearing loss due to cerumen impaction  Overview:  06/25/2024: Patient presents with muffled hearing and tinnitus.    Exam reveals bilateral cerumen impaction.    Ears successfully flushed; patient states her ears felt much better.      5. Primary hypertension  Overview:  Her blood pressure is controlled; continue to monitor.      6. Primary insomnia  Overview:  Patient has not been sleeping well.    Trial of trazodone 50 mg: Take 1 tab in the evening.  Patient advised to call with update.    02/07/2024:  Patient intolerant of trazodone secondary to nightmares.    Lunesta causes a metallic taste.    She had nightmares with Ambien.  She declines any sleep medication at this time.    05/07/2024:  Patient has been sleeping 2 hours a night due to anxiety.    Start buspirone 10 mg twice daily.    Start hydroxyzine 25 mg: Take 1 tablet at bedtime for sleep.    06/25/2024: Patient is sleeping much better with buspirone and 1/2 tablet of hydroxyzine in the evening.                ..No follow-ups on file.       No future appointments.

## 2024-06-25 ENCOUNTER — OFFICE VISIT (OUTPATIENT)
Dept: PRIMARY CARE CLINIC | Facility: CLINIC | Age: 49
End: 2024-06-25
Payer: COMMERCIAL

## 2024-06-25 VITALS
SYSTOLIC BLOOD PRESSURE: 134 MMHG | HEART RATE: 105 BPM | BODY MASS INDEX: 28.82 KG/M2 | DIASTOLIC BLOOD PRESSURE: 82 MMHG | OXYGEN SATURATION: 100 % | HEIGHT: 65 IN | TEMPERATURE: 98 F | WEIGHT: 173 LBS

## 2024-06-25 DIAGNOSIS — F90.0 ATTENTION DEFICIT HYPERACTIVITY DISORDER (ADHD), PREDOMINANTLY INATTENTIVE TYPE: ICD-10-CM

## 2024-06-25 DIAGNOSIS — I10 PRIMARY HYPERTENSION: ICD-10-CM

## 2024-06-25 DIAGNOSIS — F41.1 GENERALIZED ANXIETY DISORDER: Primary | ICD-10-CM

## 2024-06-25 DIAGNOSIS — F33.42 RECURRENT MAJOR DEPRESSIVE DISORDER, IN FULL REMISSION: ICD-10-CM

## 2024-06-25 DIAGNOSIS — H61.23 BILATERAL HEARING LOSS DUE TO CERUMEN IMPACTION: ICD-10-CM

## 2024-06-25 DIAGNOSIS — F51.01 PRIMARY INSOMNIA: ICD-10-CM

## 2024-06-25 PROCEDURE — 1159F MED LIST DOCD IN RCRD: CPT | Mod: CPTII,,, | Performed by: FAMILY MEDICINE

## 2024-06-25 PROCEDURE — 69209 REMOVE IMPACTED EAR WAX UNI: CPT | Mod: 50,,, | Performed by: FAMILY MEDICINE

## 2024-06-25 PROCEDURE — 3008F BODY MASS INDEX DOCD: CPT | Mod: CPTII,,, | Performed by: FAMILY MEDICINE

## 2024-06-25 PROCEDURE — 3075F SYST BP GE 130 - 139MM HG: CPT | Mod: CPTII,,, | Performed by: FAMILY MEDICINE

## 2024-06-25 PROCEDURE — 99214 OFFICE O/P EST MOD 30 MIN: CPT | Mod: 25,,, | Performed by: FAMILY MEDICINE

## 2024-06-25 PROCEDURE — 1160F RVW MEDS BY RX/DR IN RCRD: CPT | Mod: CPTII,,, | Performed by: FAMILY MEDICINE

## 2024-06-25 PROCEDURE — 3079F DIAST BP 80-89 MM HG: CPT | Mod: CPTII,,, | Performed by: FAMILY MEDICINE

## 2024-06-25 RX ORDER — BUSPIRONE HYDROCHLORIDE 10 MG/1
10 TABLET ORAL 2 TIMES DAILY
Qty: 60 TABLET | Refills: 5 | Status: SHIPPED | OUTPATIENT
Start: 2024-06-25 | End: 2024-12-22

## 2024-07-20 DIAGNOSIS — I10 PRIMARY HYPERTENSION: ICD-10-CM

## 2024-07-22 RX ORDER — HYDROXYZINE HYDROCHLORIDE 25 MG/1
TABLET, FILM COATED ORAL
Qty: 30 TABLET | Refills: 1 | Status: SHIPPED | OUTPATIENT
Start: 2024-07-22

## 2024-08-05 PROBLEM — Z00.00 ENCOUNTER FOR WELLNESS EXAMINATION IN ADULT: Status: RESOLVED | Noted: 2024-05-04 | Resolved: 2024-08-05

## 2024-09-04 DIAGNOSIS — I10 PRIMARY HYPERTENSION: ICD-10-CM

## 2024-09-04 RX ORDER — HYDROXYZINE HYDROCHLORIDE 25 MG/1
TABLET, FILM COATED ORAL
Qty: 30 TABLET | Refills: 1 | Status: SHIPPED | OUTPATIENT
Start: 2024-09-04

## 2025-08-14 ENCOUNTER — PATIENT MESSAGE (OUTPATIENT)
Facility: CLINIC | Age: 50
End: 2025-08-14
Payer: COMMERCIAL

## 2025-08-15 ENCOUNTER — PATIENT MESSAGE (OUTPATIENT)
Facility: CLINIC | Age: 50
End: 2025-08-15
Payer: COMMERCIAL

## 2025-08-19 ENCOUNTER — PATIENT OUTREACH (OUTPATIENT)
Facility: CLINIC | Age: 50
End: 2025-08-19
Payer: COMMERCIAL